# Patient Record
Sex: FEMALE
[De-identification: names, ages, dates, MRNs, and addresses within clinical notes are randomized per-mention and may not be internally consistent; named-entity substitution may affect disease eponyms.]

---

## 2019-11-21 PROBLEM — Z00.00 ENCOUNTER FOR PREVENTIVE HEALTH EXAMINATION: Status: ACTIVE | Noted: 2019-11-21

## 2019-11-22 ENCOUNTER — APPOINTMENT (OUTPATIENT)
Dept: ANTEPARTUM | Facility: CLINIC | Age: 34
End: 2019-11-22

## 2019-11-23 ENCOUNTER — APPOINTMENT (OUTPATIENT)
Dept: MATERNAL FETAL MEDICINE | Facility: CLINIC | Age: 34
End: 2019-11-23
Payer: COMMERCIAL

## 2019-11-23 ENCOUNTER — APPOINTMENT (OUTPATIENT)
Dept: ANTEPARTUM | Facility: CLINIC | Age: 34
End: 2019-11-23
Payer: COMMERCIAL

## 2019-11-23 ENCOUNTER — ASOB RESULT (OUTPATIENT)
Age: 34
End: 2019-11-23

## 2019-11-23 VITALS
OXYGEN SATURATION: 98 % | HEART RATE: 74 BPM | BODY MASS INDEX: 22.06 KG/M2 | SYSTOLIC BLOOD PRESSURE: 116 MMHG | HEIGHT: 68 IN | RESPIRATION RATE: 18 BRPM | WEIGHT: 145.56 LBS | DIASTOLIC BLOOD PRESSURE: 68 MMHG

## 2019-11-23 DIAGNOSIS — Z78.9 OTHER SPECIFIED HEALTH STATUS: ICD-10-CM

## 2019-11-23 DIAGNOSIS — Z82.49 FAMILY HISTORY OF ISCHEMIC HEART DISEASE AND OTHER DISEASES OF THE CIRCULATORY SYSTEM: ICD-10-CM

## 2019-11-23 DIAGNOSIS — O26.872 CERVICAL SHORTENING, SECOND TRIMESTER: ICD-10-CM

## 2019-11-23 PROCEDURE — 76817 TRANSVAGINAL US OBSTETRIC: CPT

## 2019-11-23 PROCEDURE — 99242 OFF/OP CONSLTJ NEW/EST SF 20: CPT

## 2019-11-23 RX ORDER — VITAMIN C, CALCIUM, IRON, VITAMIN D3, VITAMIN E, VITAMIN B1, VITAMIN B2, VITAMIN B3, VITAMIN B6, FOLIC ACID, IODINE, ZINC, COPPER, DOCUSATE SODIUM, DOCOSAHEXAENOIC ACID (DHA) 27-1-50 MG
KIT ORAL
Refills: 0 | Status: ACTIVE | COMMUNITY

## 2019-11-23 NOTE — DISCUSSION/SUMMARY
[FreeTextEntry1] : The patient is a 34-year-old  010 being seen today at approximately 16 weeks for possible shortened cervix. Her obstetrical history significant for one first trimester surgical termination of pregnancy.\par \par The patient is late to prenatal care and was seen in your office this week with the possibility on ultrasound of a shortened cervix. A complete ultrasound was performed today which reveals a single viable intrauterine gestation. Size is not consistent with dates with EDC based on today's study of May 4, 2020. EDC is adjusted. No gross or soft markers associated with fetal aneuploidy are noted. Cervix measures 4.3 cm with no funneling or dynamic changes seen. Vital signs today reveal a blood pressure of 116/68 and maternal weight is 145.9 pounds which is consistent with a BMI of 22.13KG.\par \par The above findings on ultrasound are reassuring and no evidence of cervical shortening or incompetence are noted. The EDC is adjusted. The patient was advised to return in 4 weeks for comprehensive ultrasound. A quad screen was drawn in our office today and those results will be forwarded to your office when available. No change in the patient's current medical management is indicated. Routine prenatal care is recommended at this time. All of the above was discussed with the patient and all of her questions were answered.\par \par Her family and medical history are noncontributory. Past surgical history significant for one first trimester termination of pregnancy and bilateral inguinal hernia repair. She has known allergies to medications and denies alcohol, tobacco or drug use.\par \par I spent a total of 30 minutes of which greater than 50% was coordinating care and counseling.\par \par Recommendations;\par \par #1. Quad screen drawn in our office today.\par #2. Comprehensive ultrasound in 4 weeks is recommended.\par #3. Followup maternal fetal medicine consultation as clinically indicated.

## 2019-12-02 LAB
2ND TRIMESTER DATA: NORMAL
ADDENDUM DOC: NORMAL
AFP PNL SERPL: NORMAL
AFP SERPL-ACNC: NORMAL
B-HCG FREE SERPL-MCNC: NORMAL
CLINICAL BIOCHEMIST REVIEW: ABNORMAL
CLINICAL BIOCHEMIST REVIEW: NORMAL
CLINICAL BIOCHEMIST REVIEW: NORMAL
INHIBIN A SERPL-MCNC: NORMAL
Lab: NORMAL
NOTES NTD: NORMAL
U ESTRIOL SERPL-SCNC: NORMAL

## 2019-12-12 ENCOUNTER — APPOINTMENT (OUTPATIENT)
Dept: OBGYN | Facility: CLINIC | Age: 34
End: 2019-12-12

## 2019-12-17 ENCOUNTER — APPOINTMENT (OUTPATIENT)
Dept: MATERNAL FETAL MEDICINE | Facility: CLINIC | Age: 34
End: 2019-12-17

## 2019-12-17 ENCOUNTER — APPOINTMENT (OUTPATIENT)
Dept: ANTEPARTUM | Facility: CLINIC | Age: 34
End: 2019-12-17

## 2020-01-07 ENCOUNTER — APPOINTMENT (OUTPATIENT)
Dept: ANTEPARTUM | Facility: CLINIC | Age: 35
End: 2020-01-07

## 2020-07-09 ENCOUNTER — TRANSCRIPTION ENCOUNTER (OUTPATIENT)
Age: 35
End: 2020-07-09